# Patient Record
Sex: MALE | URBAN - METROPOLITAN AREA
[De-identification: names, ages, dates, MRNs, and addresses within clinical notes are randomized per-mention and may not be internally consistent; named-entity substitution may affect disease eponyms.]

---

## 2018-04-02 ENCOUNTER — EMERGENCY (EMERGENCY)
Facility: HOSPITAL | Age: 23
LOS: 0 days | Discharge: HOME | End: 2018-04-02

## 2018-04-02 VITALS
HEART RATE: 81 BPM | TEMPERATURE: 98 F | SYSTOLIC BLOOD PRESSURE: 139 MMHG | DIASTOLIC BLOOD PRESSURE: 67 MMHG | OXYGEN SATURATION: 98 % | RESPIRATION RATE: 18 BRPM

## 2018-04-02 VITALS — HEIGHT: 66 IN | WEIGHT: 171.3 LBS

## 2018-04-02 DIAGNOSIS — H00.11 CHALAZION RIGHT UPPER EYELID: ICD-10-CM

## 2018-04-02 DIAGNOSIS — H00.14 CHALAZION LEFT UPPER EYELID: ICD-10-CM

## 2018-04-02 DIAGNOSIS — Z87.891 PERSONAL HISTORY OF NICOTINE DEPENDENCE: ICD-10-CM

## 2018-04-02 DIAGNOSIS — H02.821 CYSTS OF RIGHT UPPER EYELID: ICD-10-CM

## 2018-04-02 NOTE — ED PROVIDER NOTE - PHYSICAL EXAMINATION
CONST: Well appearing in NAD  EYES: PERRL, EOMI, Sclera and conjunctiva clear. both upper eyelids have non tender chalazions. No cellulitis,  ENT: No nasal discharge. TM's clear B/L without drainage. Oropharynx normal appearing, no erythema or exudates. Uvula midline.  SKIN: Warm, dry, no acute rashes. Good turgor  NEURO: A&Ox3, No focal deficits. Strength 5/5 with no sensory deficits. Steady gait

## 2018-04-02 NOTE — ED PROVIDER NOTE - OBJECTIVE STATEMENT
bilateral upper eyelid lumps x 3 months. Starting to get bigger. No pain. No fever, No visual changes

## 2018-04-02 NOTE — ED ADULT TRIAGE NOTE - CHIEF COMPLAINT QUOTE
BL eye pain x 3 months, patient has pea sized lumps to the eye lid and states " they are getting bigger", patient denies any changes in vison

## 2018-04-03 ENCOUNTER — OUTPATIENT (OUTPATIENT)
Dept: OUTPATIENT SERVICES | Facility: HOSPITAL | Age: 23
LOS: 1 days | Discharge: HOME | End: 2018-04-03

## 2018-04-06 DIAGNOSIS — H52.223 REGULAR ASTIGMATISM, BILATERAL: ICD-10-CM

## 2018-04-06 DIAGNOSIS — H52.13 MYOPIA, BILATERAL: ICD-10-CM

## 2018-04-06 DIAGNOSIS — H00.11 CHALAZION RIGHT UPPER EYELID: ICD-10-CM

## 2018-04-06 DIAGNOSIS — H00.14 CHALAZION LEFT UPPER EYELID: ICD-10-CM

## 2018-04-09 PROBLEM — Z00.00 ENCOUNTER FOR PREVENTIVE HEALTH EXAMINATION: Status: ACTIVE | Noted: 2018-04-09

## 2021-08-23 NOTE — ED ADULT NURSE NOTE - NS ED NURSE DISCH DISPOSITION
[FreeTextEntry1] : numbness, b12 def, ibs, hld, depression [de-identified] : Pt is a 45 y/o male with a hx of ibs, hld, depression/anxiety - following with Dr Royal Balbuena - getting meds adjusted, sinus issues/allergies - follows with [facility], lt forearm numbness, b12 def, vit D def\par here for f/u\par s/p B12 injections monthly. continues to take B12 - had gotten the sublingual - has been taking. Not reliable\par Has been better with the vit D\par Had seen neurology. with continued numbness at the lt forearm. no pain or weakness. Has not made f/u appt with neurology. Has been taking gabapentin.\par Still with some fatigue - no change. \par Psych. Has continued with psych meds and f/u. has been stable. Had meds adjusted.  changed to wellbutrin\par Has not changed much with the diet and exercise. \par Reports that the abd continues to feel bloated. no pain pain or diarrhea. Triggered by some foods.\par Rash has been controlled. Getting shots for alopecia \par follows with ENT for the sinuses\par \par vaccines - reported as up to date. Had covid vaccine\par had flu vaccine  Discharged